# Patient Record
Sex: FEMALE | Race: WHITE | HISPANIC OR LATINO | Employment: FULL TIME | URBAN - NONMETROPOLITAN AREA
[De-identification: names, ages, dates, MRNs, and addresses within clinical notes are randomized per-mention and may not be internally consistent; named-entity substitution may affect disease eponyms.]

---

## 2023-09-17 ENCOUNTER — HOSPITAL ENCOUNTER (EMERGENCY)
Facility: HOSPITAL | Age: 28
Discharge: HOME | End: 2023-09-17
Attending: EMERGENCY MEDICINE
Payer: MEDICAID

## 2023-09-17 ENCOUNTER — APPOINTMENT (OUTPATIENT)
Dept: GENERAL RADIOLOGY | Facility: HOSPITAL | Age: 28
End: 2023-09-17
Payer: MEDICAID

## 2023-09-17 VITALS
HEIGHT: 62 IN | SYSTOLIC BLOOD PRESSURE: 101 MMHG | BODY MASS INDEX: 35.7 KG/M2 | OXYGEN SATURATION: 94 % | TEMPERATURE: 98.4 F | WEIGHT: 194 LBS | HEART RATE: 86 BPM | DIASTOLIC BLOOD PRESSURE: 64 MMHG | RESPIRATION RATE: 18 BRPM

## 2023-09-17 DIAGNOSIS — R13.10 ODYNOPHAGIA: Primary | ICD-10-CM

## 2023-09-17 LAB — S PYO AG THROAT QL: NEGATIVE

## 2023-09-17 PROCEDURE — 93005 ELECTROCARDIOGRAM TRACING: CPT | Performed by: EMERGENCY MEDICINE

## 2023-09-17 PROCEDURE — 71045 X-RAY EXAM CHEST 1 VIEW: CPT

## 2023-09-17 PROCEDURE — 87081 CULTURE SCREEN ONLY: CPT | Performed by: EMERGENCY MEDICINE

## 2023-09-17 PROCEDURE — 99284 EMERGENCY DEPT VISIT MOD MDM: CPT

## 2023-09-17 PROCEDURE — 87880 STREP A ASSAY W/OPTIC: CPT | Performed by: EMERGENCY MEDICINE

## 2023-09-19 LAB — BACTERIA SPEC AEROBE CULT: NORMAL

## 2023-09-22 NOTE — ED PROVIDER NOTES
"Subjective  History of Present Illness:    Chief Complaint: Painful swallowing, feels as though something is stuck in her throat    History of Present Illness: 28-year-old female presents with painful swallowing sore throat feeling the symptoms stuck in her throat for the past 10 days.    Nurses Notes reviewed and agree, including vitals, allergies, social history and prior medical history.     REVIEW OF SYSTEMS: All systems reviewed and not pertinent unless noted.  Review of Systems      Positive for: Painful swallowing feels though something is stuck in her throat    Negative for: Difficulty handling secretions no hoarseness    History reviewed. No pertinent past medical history.    Allergies:    Patient has no known allergies.      History reviewed. No pertinent surgical history.      Social History     Socioeconomic History    Marital status: Single         History reviewed. No pertinent family history.    Objective  Physical Exam:  /64   Pulse 86   Temp 98.4 °F (36.9 °C)   Resp 18   Ht 157.5 cm (62\")   Wt 88 kg (194 lb)   LMP 09/10/2023   SpO2 94%   BMI 35.48 kg/m²      Physical Exam    CONSTITUTIONAL: Well developed, healthy-appearing nontoxic 28-year-old female,  in no acute distress.  VITAL SIGNS: per nursing, reviewed and noted  SKIN: exposed skin with no rashes, ulcerations or petechiae  EYES: Grossly EOMI, no icterus  ENT: Normal voice.  Moist mucous membranes no posterior pharyngeal erythema or exudate.  No trismus  RESPIRATORY:  No increased work of breathing. No retractions.   CARDIOVASCULAR:   Extremities pink and warm.  Good cap refill to extremities.   MUSCULOSKELETAL: Age-appropriate bulk and tone, moves all 4 extremities  NEUROLOGIC: Alert, oriented x 3. No gross deficits. GCS 15.   PSYCH: appropriate affect.  Procedures    ED Course:    Lab Results (last 24 hours)       ** No results found for the last 24 hours. **             No radiology results from the last 24 hrs     MDM   "   Amount and/or Complexity of Data Reviewed  Clinical lab tests: reviewed  Tests in the radiology section of CPT®: reviewed  Tests in the medicine section of CPT®: reviewed  Independent visualization of images, tracings, or specimens: yes             Medical Decision Making:    Initial impression of presenting illness: 28-year-old female presents with painful swallowing sore throat feeling the symptoms stuck in her throat for the past 10 days.      DDX: includes but is not limited to: Tonsillitis         Patient arrives normotensive afebrile no tachycardia sats 94% with vitals interpreted by myself.     Pertinent features from physical exam: Normal voice.  Moist mucous membranes no posterior pharyngeal erythema or exudate.  No trismus  RESPIRATORY:  No increased work of breathing. No retractions. .    Initial diagnostic plan: strep chest x-ray    Results from initial plan were reviewed and interpreted by me revealing no acute findings on my interpretation of chest x-ray.  Strep negative  Throat culture pending  Diagnostic information from other sources: None    Interventions / Re-evaluation: Supportive care, provided information for ENT if persistent symptoms    Results/clinical rationale were discussed with patient    Consultations/Discussion of results with other physicians: None    Disposition plan: Patient was discharged in home stable condition.  Counseled on supportive care, outpatient follow-up. Return precaution discussed.  Patient/family was understanding and agreeable with plan    -----      Final diagnoses:   Odynophagia            Darrin Alston,   09/22/23 0800

## 2025-06-26 ENCOUNTER — ANESTHESIA EVENT (OUTPATIENT)
Dept: PERIOP | Facility: HOSPITAL | Age: 30
End: 2025-06-26
Payer: MEDICAID

## 2025-06-26 ENCOUNTER — ANESTHESIA (OUTPATIENT)
Dept: PERIOP | Facility: HOSPITAL | Age: 30
End: 2025-06-26
Payer: MEDICAID

## 2025-06-26 ENCOUNTER — APPOINTMENT (OUTPATIENT)
Dept: ULTRASOUND IMAGING | Facility: HOSPITAL | Age: 30
End: 2025-06-26
Payer: MEDICAID

## 2025-06-26 ENCOUNTER — HOSPITAL ENCOUNTER (EMERGENCY)
Facility: HOSPITAL | Age: 30
Discharge: HOME | End: 2025-06-26
Attending: STUDENT IN AN ORGANIZED HEALTH CARE EDUCATION/TRAINING PROGRAM
Payer: MEDICAID

## 2025-06-26 VITALS
OXYGEN SATURATION: 95 % | RESPIRATION RATE: 16 BRPM | HEART RATE: 102 BPM | DIASTOLIC BLOOD PRESSURE: 80 MMHG | HEIGHT: 60 IN | SYSTOLIC BLOOD PRESSURE: 106 MMHG | WEIGHT: 195 LBS | BODY MASS INDEX: 38.28 KG/M2 | TEMPERATURE: 97 F

## 2025-06-26 DIAGNOSIS — O03.9 FETAL DEMISE DUE TO MISCARRIAGE: Primary | ICD-10-CM

## 2025-06-26 DIAGNOSIS — O02.1 MISSED ABORTION: ICD-10-CM

## 2025-06-26 LAB
ABO GROUP BLD: NORMAL
ABO GROUP BLD: NORMAL
ALBUMIN SERPL-MCNC: 3.5 G/DL (ref 3.5–5.2)
ALBUMIN/GLOB SERPL: 1.1 G/DL
ALP SERPL-CCNC: 51 U/L (ref 39–117)
ALT SERPL W P-5'-P-CCNC: 35 U/L (ref 1–33)
ANION GAP SERPL CALCULATED.3IONS-SCNC: 11.8 MMOL/L (ref 5–15)
AST SERPL-CCNC: 19 U/L (ref 1–32)
BACTERIA UR QL AUTO: ABNORMAL /HPF
BASOPHILS # BLD AUTO: 0.02 10*3/MM3 (ref 0–0.2)
BASOPHILS NFR BLD AUTO: 0.2 % (ref 0–1.5)
BILIRUB SERPL-MCNC: 0.2 MG/DL (ref 0–1.2)
BILIRUB UR QL STRIP: NEGATIVE
BUN SERPL-MCNC: 5 MG/DL (ref 6–20)
BUN/CREAT SERPL: 11.4 (ref 7–25)
CALCIUM SPEC-SCNC: 9 MG/DL (ref 8.6–10.5)
CHLORIDE SERPL-SCNC: 105 MMOL/L (ref 98–107)
CLARITY UR: ABNORMAL
CO2 SERPL-SCNC: 22.2 MMOL/L (ref 22–29)
COLOR UR: ABNORMAL
CREAT SERPL-MCNC: 0.44 MG/DL (ref 0.57–1)
CRP SERPL-MCNC: 2.22 MG/DL (ref 0–0.5)
D-LACTATE SERPL-SCNC: 1.8 MMOL/L (ref 0.5–2)
DEPRECATED RDW RBC AUTO: 40.3 FL (ref 37–54)
EGFRCR SERPLBLD CKD-EPI 2021: 133.6 ML/MIN/1.73
EOSINOPHIL # BLD AUTO: 0.12 10*3/MM3 (ref 0–0.4)
EOSINOPHIL NFR BLD AUTO: 1.2 % (ref 0.3–6.2)
ERYTHROCYTE [DISTWIDTH] IN BLOOD BY AUTOMATED COUNT: 12.6 % (ref 12.3–15.4)
FLUAV RNA RESP QL NAA+PROBE: NOT DETECTED
FLUBV RNA RESP QL NAA+PROBE: NOT DETECTED
GLOBULIN UR ELPH-MCNC: 3.3 GM/DL
GLUCOSE SERPL-MCNC: 84 MG/DL (ref 65–99)
GLUCOSE UR STRIP-MCNC: NEGATIVE MG/DL
HCG INTACT+B SERPL-ACNC: NORMAL MIU/ML
HCT VFR BLD AUTO: 39.4 % (ref 34–46.6)
HGB BLD-MCNC: 13.3 G/DL (ref 12–15.9)
HGB UR QL STRIP.AUTO: ABNORMAL
HYALINE CASTS UR QL AUTO: ABNORMAL /LPF
IMM GRANULOCYTES # BLD AUTO: 0.02 10*3/MM3 (ref 0–0.05)
IMM GRANULOCYTES NFR BLD AUTO: 0.2 % (ref 0–0.5)
KETONES UR QL STRIP: NEGATIVE
LEUKOCYTE ESTERASE UR QL STRIP.AUTO: ABNORMAL
LIPASE SERPL-CCNC: 24 U/L (ref 13–60)
LYMPHOCYTES # BLD AUTO: 2.58 10*3/MM3 (ref 0.7–3.1)
LYMPHOCYTES NFR BLD AUTO: 25.6 % (ref 19.6–45.3)
MCH RBC QN AUTO: 29.4 PG (ref 26.6–33)
MCHC RBC AUTO-ENTMCNC: 33.8 G/DL (ref 31.5–35.7)
MCV RBC AUTO: 87.2 FL (ref 79–97)
MONOCYTES # BLD AUTO: 0.61 10*3/MM3 (ref 0.1–0.9)
MONOCYTES NFR BLD AUTO: 6 % (ref 5–12)
NEUTROPHILS NFR BLD AUTO: 6.74 10*3/MM3 (ref 1.7–7)
NEUTROPHILS NFR BLD AUTO: 66.8 % (ref 42.7–76)
NITRITE UR QL STRIP: NEGATIVE
NRBC BLD AUTO-RTO: 0 /100 WBC (ref 0–0.2)
NUMBER OF DOSES: NORMAL
PH UR STRIP.AUTO: 7 [PH] (ref 5–8)
PLATELET # BLD AUTO: 304 10*3/MM3 (ref 140–450)
PMV BLD AUTO: 10.6 FL (ref 6–12)
POTASSIUM SERPL-SCNC: 3.9 MMOL/L (ref 3.5–5.2)
PROT SERPL-MCNC: 6.8 G/DL (ref 6–8.5)
PROT UR QL STRIP: ABNORMAL
RBC # BLD AUTO: 4.52 10*6/MM3 (ref 3.77–5.28)
RBC # UR STRIP: ABNORMAL /HPF
REF LAB TEST METHOD: ABNORMAL
RH BLD: POSITIVE
RH BLD: POSITIVE
SARS-COV-2 RNA RESP QL NAA+PROBE: NOT DETECTED
SODIUM SERPL-SCNC: 139 MMOL/L (ref 136–145)
SP GR UR STRIP: 1.02 (ref 1–1.03)
SQUAMOUS #/AREA URNS HPF: ABNORMAL /HPF
UROBILINOGEN UR QL STRIP: ABNORMAL
WBC # UR STRIP: ABNORMAL /HPF
WBC NRBC COR # BLD AUTO: 10.09 10*3/MM3 (ref 3.4–10.8)

## 2025-06-26 PROCEDURE — 86900 BLOOD TYPING SEROLOGIC ABO: CPT

## 2025-06-26 PROCEDURE — 25010000002 PROPOFOL 10 MG/ML EMULSION: Performed by: NURSE ANESTHETIST, CERTIFIED REGISTERED

## 2025-06-26 PROCEDURE — 99223 1ST HOSP IP/OBS HIGH 75: CPT | Performed by: OBSTETRICS & GYNECOLOGY

## 2025-06-26 PROCEDURE — 76817 TRANSVAGINAL US OBSTETRIC: CPT

## 2025-06-26 PROCEDURE — 86901 BLOOD TYPING SEROLOGIC RH(D): CPT | Performed by: OBSTETRICS & GYNECOLOGY

## 2025-06-26 PROCEDURE — 25010000002 MIDAZOLAM PER 1MG: Performed by: NURSE ANESTHETIST, CERTIFIED REGISTERED

## 2025-06-26 PROCEDURE — 25010000002 FAMOTIDINE 10 MG/ML SOLUTION: Performed by: NURSE ANESTHETIST, CERTIFIED REGISTERED

## 2025-06-26 PROCEDURE — 81001 URINALYSIS AUTO W/SCOPE: CPT | Performed by: STUDENT IN AN ORGANIZED HEALTH CARE EDUCATION/TRAINING PROGRAM

## 2025-06-26 PROCEDURE — 25010000002 FENTANYL CITRATE (PF) 50 MCG/ML SOLUTION PREFILLED SYRINGE: Performed by: NURSE ANESTHETIST, CERTIFIED REGISTERED

## 2025-06-26 PROCEDURE — 25010000002 LIDOCAINE 1% - EPINEPHRINE 1:100000 1 %-1:100000 SOLUTION: Performed by: OBSTETRICS & GYNECOLOGY

## 2025-06-26 PROCEDURE — 86901 BLOOD TYPING SEROLOGIC RH(D): CPT

## 2025-06-26 PROCEDURE — 87636 SARSCOV2 & INF A&B AMP PRB: CPT | Performed by: STUDENT IN AN ORGANIZED HEALTH CARE EDUCATION/TRAINING PROGRAM

## 2025-06-26 PROCEDURE — 83605 ASSAY OF LACTIC ACID: CPT | Performed by: STUDENT IN AN ORGANIZED HEALTH CARE EDUCATION/TRAINING PROGRAM

## 2025-06-26 PROCEDURE — 80053 COMPREHEN METABOLIC PANEL: CPT | Performed by: STUDENT IN AN ORGANIZED HEALTH CARE EDUCATION/TRAINING PROGRAM

## 2025-06-26 PROCEDURE — 87086 URINE CULTURE/COLONY COUNT: CPT | Performed by: STUDENT IN AN ORGANIZED HEALTH CARE EDUCATION/TRAINING PROGRAM

## 2025-06-26 PROCEDURE — 25010000002 HYDROMORPHONE PER 4 MG: Performed by: NURSE ANESTHETIST, CERTIFIED REGISTERED

## 2025-06-26 PROCEDURE — 25010000002 LIDOCAINE (CARDIAC): Performed by: NURSE ANESTHETIST, CERTIFIED REGISTERED

## 2025-06-26 PROCEDURE — 86900 BLOOD TYPING SEROLOGIC ABO: CPT | Performed by: OBSTETRICS & GYNECOLOGY

## 2025-06-26 PROCEDURE — 25010000002 KETOROLAC TROMETHAMINE PER 15 MG: Performed by: NURSE ANESTHETIST, CERTIFIED REGISTERED

## 2025-06-26 PROCEDURE — 84702 CHORIONIC GONADOTROPIN TEST: CPT | Performed by: STUDENT IN AN ORGANIZED HEALTH CARE EDUCATION/TRAINING PROGRAM

## 2025-06-26 PROCEDURE — 99285 EMERGENCY DEPT VISIT HI MDM: CPT

## 2025-06-26 PROCEDURE — 25810000003 LACTATED RINGERS PER 1000 ML: Performed by: NURSE ANESTHETIST, CERTIFIED REGISTERED

## 2025-06-26 PROCEDURE — 59820 CARE OF MISCARRIAGE: CPT | Performed by: OBSTETRICS & GYNECOLOGY

## 2025-06-26 PROCEDURE — 83690 ASSAY OF LIPASE: CPT | Performed by: STUDENT IN AN ORGANIZED HEALTH CARE EDUCATION/TRAINING PROGRAM

## 2025-06-26 PROCEDURE — 85025 COMPLETE CBC W/AUTO DIFF WBC: CPT | Performed by: STUDENT IN AN ORGANIZED HEALTH CARE EDUCATION/TRAINING PROGRAM

## 2025-06-26 PROCEDURE — 25010000002 DEXAMETHASONE PER 1 MG: Performed by: NURSE ANESTHETIST, CERTIFIED REGISTERED

## 2025-06-26 PROCEDURE — 36415 COLL VENOUS BLD VENIPUNCTURE: CPT

## 2025-06-26 PROCEDURE — 86140 C-REACTIVE PROTEIN: CPT | Performed by: STUDENT IN AN ORGANIZED HEALTH CARE EDUCATION/TRAINING PROGRAM

## 2025-06-26 PROCEDURE — 25010000002 ONDANSETRON PER 1 MG: Performed by: NURSE ANESTHETIST, CERTIFIED REGISTERED

## 2025-06-26 PROCEDURE — 99285 EMERGENCY DEPT VISIT HI MDM: CPT | Performed by: STUDENT IN AN ORGANIZED HEALTH CARE EDUCATION/TRAINING PROGRAM

## 2025-06-26 RX ORDER — ACETAMINOPHEN 500 MG
1000 TABLET ORAL EVERY 8 HOURS PRN
Qty: 60 TABLET | Refills: 0 | Status: SHIPPED | OUTPATIENT
Start: 2025-06-26 | End: 2026-06-26

## 2025-06-26 RX ORDER — SODIUM CHLORIDE 0.9 % (FLUSH) 0.9 %
3 SYRINGE (ML) INJECTION EVERY 12 HOURS SCHEDULED
Status: DISCONTINUED | OUTPATIENT
Start: 2025-06-26 | End: 2025-06-26 | Stop reason: HOSPADM

## 2025-06-26 RX ORDER — FENTANYL CITRATE 50 UG/ML
INJECTION, SOLUTION INTRAMUSCULAR; INTRAVENOUS AS NEEDED
Status: DISCONTINUED | OUTPATIENT
Start: 2025-06-26 | End: 2025-06-26 | Stop reason: SURG

## 2025-06-26 RX ORDER — DEXAMETHASONE SODIUM PHOSPHATE 4 MG/ML
INJECTION, SOLUTION INTRA-ARTICULAR; INTRALESIONAL; INTRAMUSCULAR; INTRAVENOUS; SOFT TISSUE AS NEEDED
Status: DISCONTINUED | OUTPATIENT
Start: 2025-06-26 | End: 2025-06-26 | Stop reason: SURG

## 2025-06-26 RX ORDER — IBUPROFEN 600 MG/1
600 TABLET, FILM COATED ORAL EVERY 6 HOURS PRN
Status: DISCONTINUED | OUTPATIENT
Start: 2025-06-26 | End: 2025-06-26 | Stop reason: HOSPADM

## 2025-06-26 RX ORDER — KETOROLAC TROMETHAMINE 30 MG/ML
INJECTION, SOLUTION INTRAMUSCULAR; INTRAVENOUS AS NEEDED
Status: DISCONTINUED | OUTPATIENT
Start: 2025-06-26 | End: 2025-06-26 | Stop reason: SURG

## 2025-06-26 RX ORDER — DOXYCYCLINE 100 MG/1
200 CAPSULE ORAL ONCE
Status: COMPLETED | OUTPATIENT
Start: 2025-06-26 | End: 2025-06-26

## 2025-06-26 RX ORDER — ONDANSETRON 2 MG/ML
INJECTION INTRAMUSCULAR; INTRAVENOUS AS NEEDED
Status: DISCONTINUED | OUTPATIENT
Start: 2025-06-26 | End: 2025-06-26 | Stop reason: SURG

## 2025-06-26 RX ORDER — PROPOFOL 10 MG/ML
VIAL (ML) INTRAVENOUS AS NEEDED
Status: DISCONTINUED | OUTPATIENT
Start: 2025-06-26 | End: 2025-06-26 | Stop reason: SURG

## 2025-06-26 RX ORDER — MIDAZOLAM HYDROCHLORIDE 2 MG/2ML
INJECTION, SOLUTION INTRAMUSCULAR; INTRAVENOUS AS NEEDED
Status: DISCONTINUED | OUTPATIENT
Start: 2025-06-26 | End: 2025-06-26 | Stop reason: SURG

## 2025-06-26 RX ORDER — HYDROMORPHONE HYDROCHLORIDE 2 MG/ML
INJECTION, SOLUTION INTRAMUSCULAR; INTRAVENOUS; SUBCUTANEOUS AS NEEDED
Status: DISCONTINUED | OUTPATIENT
Start: 2025-06-26 | End: 2025-06-26 | Stop reason: SURG

## 2025-06-26 RX ORDER — LIDOCAINE HYDROCHLORIDE AND EPINEPHRINE 10; 10 MG/ML; UG/ML
INJECTION, SOLUTION INFILTRATION; PERINEURAL AS NEEDED
Status: DISCONTINUED | OUTPATIENT
Start: 2025-06-26 | End: 2025-06-26 | Stop reason: HOSPADM

## 2025-06-26 RX ORDER — FAMOTIDINE 10 MG/ML
20 INJECTION, SOLUTION INTRAVENOUS ONCE
Status: COMPLETED | OUTPATIENT
Start: 2025-06-26 | End: 2025-06-26

## 2025-06-26 RX ORDER — SODIUM CHLORIDE 9 MG/ML
40 INJECTION, SOLUTION INTRAVENOUS AS NEEDED
Status: DISCONTINUED | OUTPATIENT
Start: 2025-06-26 | End: 2025-06-26 | Stop reason: HOSPADM

## 2025-06-26 RX ORDER — HYDROCODONE BITARTRATE AND ACETAMINOPHEN 5; 325 MG/1; MG/1
1 TABLET ORAL ONCE AS NEEDED
Status: DISCONTINUED | OUTPATIENT
Start: 2025-06-26 | End: 2025-06-26 | Stop reason: HOSPADM

## 2025-06-26 RX ORDER — SODIUM CHLORIDE, SODIUM LACTATE, POTASSIUM CHLORIDE, CALCIUM CHLORIDE 600; 310; 30; 20 MG/100ML; MG/100ML; MG/100ML; MG/100ML
INJECTION, SOLUTION INTRAVENOUS CONTINUOUS PRN
Status: DISCONTINUED | OUTPATIENT
Start: 2025-06-26 | End: 2025-06-26 | Stop reason: SURG

## 2025-06-26 RX ORDER — SODIUM CHLORIDE 0.9 % (FLUSH) 0.9 %
10 SYRINGE (ML) INJECTION AS NEEDED
Status: DISCONTINUED | OUTPATIENT
Start: 2025-06-26 | End: 2025-06-26 | Stop reason: HOSPADM

## 2025-06-26 RX ORDER — DOXYCYCLINE 100 MG/1
100 CAPSULE ORAL ONCE
Status: COMPLETED | OUTPATIENT
Start: 2025-06-26 | End: 2025-06-26

## 2025-06-26 RX ORDER — IBUPROFEN 800 MG/1
800 TABLET, FILM COATED ORAL EVERY 8 HOURS PRN
Qty: 30 TABLET | Refills: 0 | Status: SHIPPED | OUTPATIENT
Start: 2025-06-26

## 2025-06-26 RX ADMIN — LIDOCAINE HYDROCHLORIDE 50 MG: 20 INJECTION, SOLUTION INTRAVENOUS at 10:17

## 2025-06-26 RX ADMIN — DEXAMETHASONE SODIUM PHOSPHATE 4 MG: 4 INJECTION, SOLUTION INTRA-ARTICULAR; INTRALESIONAL; INTRAMUSCULAR; INTRAVENOUS; SOFT TISSUE at 10:24

## 2025-06-26 RX ADMIN — KETOROLAC TROMETHAMINE 15 MG: 30 INJECTION, SOLUTION INTRAMUSCULAR; INTRAVENOUS at 10:24

## 2025-06-26 RX ADMIN — PROPOFOL 100 MCG/KG/MIN: 10 INJECTION, EMULSION INTRAVENOUS at 10:17

## 2025-06-26 RX ADMIN — DOXYCYCLINE 200 MG: 100 CAPSULE ORAL at 11:06

## 2025-06-26 RX ADMIN — SODIUM CHLORIDE, POTASSIUM CHLORIDE, SODIUM LACTATE AND CALCIUM CHLORIDE: 600; 310; 30; 20 INJECTION, SOLUTION INTRAVENOUS at 10:08

## 2025-06-26 RX ADMIN — DOXYCYCLINE 100 MG: 100 CAPSULE ORAL at 10:00

## 2025-06-26 RX ADMIN — FAMOTIDINE 20 MG: 10 INJECTION, SOLUTION INTRAVENOUS at 10:00

## 2025-06-26 RX ADMIN — FENTANYL CITRATE 50 MCG: 50 INJECTION, SOLUTION INTRAMUSCULAR; INTRAVENOUS at 10:11

## 2025-06-26 RX ADMIN — HYDROMORPHONE HYDROCHLORIDE 0.5 MG: 2 INJECTION, SOLUTION INTRAMUSCULAR; INTRAVENOUS; SUBCUTANEOUS at 10:27

## 2025-06-26 RX ADMIN — PROPOFOL 100 MG: 10 INJECTION, EMULSION INTRAVENOUS at 10:16

## 2025-06-26 RX ADMIN — MIDAZOLAM HYDROCHLORIDE 2 MG: 1 INJECTION, SOLUTION INTRAMUSCULAR; INTRAVENOUS at 10:11

## 2025-06-26 RX ADMIN — ONDANSETRON 4 MG: 2 INJECTION INTRAMUSCULAR; INTRAVENOUS at 10:24

## 2025-06-26 NOTE — ANESTHESIA PREPROCEDURE EVALUATION
Anesthesia Evaluation     Patient summary reviewed and Nursing notes reviewed   no history of anesthetic complications:   NPO Solid Status: > 8 hours  NPO Liquid Status: > 8 hours           Airway   Mallampati: II  TM distance: >3 FB  Neck ROM: full  No difficulty expected  Dental - normal exam     Pulmonary - negative pulmonary ROS and normal exam   Cardiovascular - negative cardio ROS and normal exam  Exercise tolerance: good (4-7 METS)    ECG reviewed      ROS comment: EKG: SR     Neuro/Psych- negative ROS  GI/Hepatic/Renal/Endo - negative ROS     Musculoskeletal (-) negative ROS    Abdominal    Substance History - negative use     OB/GYN          Other - negative ROS       ROS/Med Hx Other: 13.3/39.4  K 3.9              Anesthesia Plan    ASA 3     MAC     (Risks and benefits discussed including risk of aspiration, recall and dental damage. All patient questions answered. Will continue with POC.)  intravenous induction     Anesthetic plan, risks, benefits, and alternatives have been provided, discussed and informed consent has been obtained with: patient.    Plan discussed with CRNA.    CODE STATUS:    Code Status (Patient has no pulse and is not breathing): CPR (Attempt to Resuscitate)  Medical Interventions (Patient has pulse or is breathing): Full Support  Level Of Support Discussed With: Patient

## 2025-06-26 NOTE — ED PROVIDER NOTES
Subjective:  History of Present Illness:    Patient is a 30-year-old female, G1, P0 with no significant medical history who presents today with vaginal bleeding.  Reports that she began to have vaginal bleeding this morning, patient is 10 weeks and was concerned for fetal viability and now presents.  Patient reports that she has had a ultrasound prior to arrival that showed intrauterine pregnancy with twins.  She denies any fevers.  No chest pain.  Denies any shortness of breath.  No abdominal pain or cramping.  Reports nausea with no vomiting or diarrhea.  Denies any dysuria.  No new leg swelling or leg pain.      Nurses Notes reviewed and agree, including vitals, allergies, social history and prior medical history.     REVIEW OF SYSTEMS: All systems reviewed and not pertinent unless noted.  Review of Systems   Constitutional:  Positive for activity change. Negative for appetite change, chills, fatigue and fever.   HENT:  Negative for rhinorrhea, sinus pressure and sinus pain.    Eyes:  Negative for discharge and itching.   Respiratory:  Negative for cough and shortness of breath.    Cardiovascular:  Negative for chest pain and leg swelling.   Gastrointestinal:  Negative for abdominal distention, abdominal pain, nausea and vomiting.   Endocrine: Negative for cold intolerance and heat intolerance.   Genitourinary:  Positive for vaginal bleeding. Negative for decreased urine volume, difficulty urinating, flank pain, frequency, urgency, vaginal discharge and vaginal pain.   Musculoskeletal:  Negative for gait problem, neck pain and neck stiffness.   Skin:  Negative for color change.   Allergic/Immunologic: Negative for environmental allergies.   Neurological:  Negative for seizures, syncope, facial asymmetry and speech difficulty.   Psychiatric/Behavioral:  Negative for self-injury and suicidal ideas.        Past Medical History:   Diagnosis Date    GERD (gastroesophageal reflux disease)        Allergies:    Patient  "has no known allergies.      History reviewed. No pertinent surgical history.      Social History     Socioeconomic History    Marital status: Single   Tobacco Use    Smoking status: Never    Smokeless tobacco: Never   Vaping Use    Vaping status: Never Used   Substance and Sexual Activity    Alcohol use: Never    Drug use: Never    Sexual activity: Defer         History reviewed. No pertinent family history.    Objective  Physical Exam:  /80 (Patient Position: Sitting)   Pulse 102   Temp 97 °F (36.1 °C) (Temporal)   Resp 16   Ht 152.4 cm (60\")   Wt 88.5 kg (195 lb)   LMP 04/18/2025   SpO2 95%   BMI 38.08 kg/m²      Physical Exam  Constitutional:       General: She is not in acute distress.     Appearance: Normal appearance. She is obese. She is not ill-appearing.   HENT:      Head: Normocephalic and atraumatic.      Nose: Nose normal. No congestion or rhinorrhea.      Mouth/Throat:      Mouth: Mucous membranes are dry.      Pharynx: Oropharynx is clear. No oropharyngeal exudate or posterior oropharyngeal erythema.   Eyes:      Extraocular Movements: Extraocular movements intact.      Conjunctiva/sclera: Conjunctivae normal.      Pupils: Pupils are equal, round, and reactive to light.   Cardiovascular:      Rate and Rhythm: Normal rate and regular rhythm.      Pulses: Normal pulses.      Heart sounds: Normal heart sounds.   Pulmonary:      Effort: Pulmonary effort is normal. No respiratory distress.      Breath sounds: Normal breath sounds.   Abdominal:      General: Abdomen is flat. Bowel sounds are normal. There is no distension.      Palpations: Abdomen is soft.      Tenderness: There is no abdominal tenderness. There is no right CVA tenderness, left CVA tenderness, guarding or rebound.   Musculoskeletal:         General: No swelling or tenderness. Normal range of motion.      Cervical back: Normal range of motion and neck supple.   Skin:     General: Skin is warm and dry.      Capillary Refill: " Capillary refill takes less than 2 seconds.   Neurological:      General: No focal deficit present.      Mental Status: She is alert and oriented to person, place, and time. Mental status is at baseline.      Cranial Nerves: No cranial nerve deficit.      Sensory: No sensory deficit.      Motor: No weakness.      Coordination: Coordination normal.      Gait: Gait normal.   Psychiatric:         Mood and Affect: Mood normal.         Behavior: Behavior normal.         Thought Content: Thought content normal.         Judgment: Judgment normal.         Procedures    ED Course:         Lab Results (last 24 hours)       Procedure Component Value Units Date/Time    CBC & Differential [[id number]]  (Normal) Collected: 06/26/25 0711    Specimen: Blood Updated: 06/26/25 0723    Narrative:      The following orders were created for panel order CBC & Differential.  Procedure                               Abnormality         Status                     ---------                               -----------         ------                     CBC Auto Differential[[id number]]        Normal              Final result                 Please view results for these tests on the individual orders.    Comprehensive Metabolic Panel [[id number]]  (Abnormal) Collected: 06/26/25 0711    Specimen: Blood Updated: 06/26/25 0806     Glucose 84 mg/dL      BUN 5.0 mg/dL      Creatinine 0.44 mg/dL      Sodium 139 mmol/L      Potassium 3.9 mmol/L      Comment: Slight hemolysis detected by analyzer. Result may be falsely elevated.        Chloride 105 mmol/L      CO2 22.2 mmol/L      Calcium 9.0 mg/dL      Total Protein 6.8 g/dL      Albumin 3.5 g/dL      ALT (SGPT) 35 U/L      AST (SGOT) 19 U/L      Comment: Slight hemolysis detected by analyzer. Result may be falsely elevated.        Alkaline Phosphatase 51 U/L      Total Bilirubin 0.2 mg/dL      Globulin 3.3 gm/dL      A/G Ratio 1.1 g/dL      BUN/Creatinine Ratio 11.4     Anion Gap 11.8 mmol/L      eGFR  133.6 mL/min/1.73     Narrative:      GFR Categories in Chronic Kidney Disease (CKD)              GFR Category          GFR (mL/min/1.73)    Interpretation  G1                    90 or greater        Normal or high (1)  G2                    60-89                Mild decrease (1)  G3a                   45-59                Mild to moderate decrease  G3b                   30-44                Moderate to severe decrease  G4                    15-29                Severe decrease  G5                    14 or less           Kidney failure    (1)In the absence of evidence of kidney disease, neither GFR category G1 or G2 fulfill the criteria for CKD.    eGFR calculation 2021 CKD-EPI creatinine equation, which does not include race as a factor    Lipase [[id number]]  (Normal) Collected: 06/26/25 0711    Specimen: Blood Updated: 06/26/25 0740     Lipase 24 U/L     hCG, Quantitative, Pregnancy [[id number]] Collected: 06/26/25 0711    Specimen: Blood Updated: 06/26/25 0835     HCG Quantitative 125,303.00 mIU/mL     Narrative:      HCG Ranges by Gestational Age    Females - non-pregnant premenopausal   </= 1mIU/mL HCG  Females - postmenopausal               </= 7mIU/mL HCG    3 Weeks         5.8 -    71.2 mIU/mL  4 Weeks         9.5 -     750 mIU/mL  5 Weeks         217 -   7,138 mIU/mL  6 Weeks         158 -  31,795 mIU/mL  7 Weeks       3,697 - 163,563 mIU/mL  8 Weeks      32,065 - 149,571 mIU/mL  9 Weeks      63,803 - 151,410 mIU/mL  10 Weeks     46,509 - 186,977 mIU/mL  12 Weeks     27,832 - 210,612 mIU/mL  14 Weeks     13,950 -  62,530 mIU/mL  15 Weeks     12,039 -  70,971 mIU/mL  16 Weeks      9,040 -  56,451 mIU/mL  17 Weeks      8,175 -  55,868 mIU/mL  18 Weeks      8,099 -  58,176 mIU/mL    C-reactive Protein [[id number]]  (Abnormal) Collected: 06/26/25 0711    Specimen: Blood Updated: 06/26/25 0740     C-Reactive Protein 2.22 mg/dL     Lactic Acid, Plasma [[id number]]  (Normal) Collected: 06/26/25 0711    Specimen:  Blood Updated: 06/26/25 0738     Lactate 1.8 mmol/L     CBC Auto Differential [[id number]]  (Normal) Collected: 06/26/25 0711    Specimen: Blood Updated: 06/26/25 0723     WBC 10.09 10*3/mm3      RBC 4.52 10*6/mm3      Hemoglobin 13.3 g/dL      Hematocrit 39.4 %      MCV 87.2 fL      MCH 29.4 pg      MCHC 33.8 g/dL      RDW 12.6 %      RDW-SD 40.3 fl      MPV 10.6 fL      Platelets 304 10*3/mm3      Neutrophil % 66.8 %      Lymphocyte % 25.6 %      Monocyte % 6.0 %      Eosinophil % 1.2 %      Basophil % 0.2 %      Immature Grans % 0.2 %      Neutrophils, Absolute 6.74 10*3/mm3      Lymphocytes, Absolute 2.58 10*3/mm3      Monocytes, Absolute 0.61 10*3/mm3      Eosinophils, Absolute 0.12 10*3/mm3      Basophils, Absolute 0.02 10*3/mm3      Immature Grans, Absolute 0.02 10*3/mm3      nRBC 0.0 /100 WBC     Urinalysis With Culture If Indicated - Urine, Clean Catch [[id number]]  (Abnormal) Collected: 06/26/25 0712    Specimen: Urine, Clean Catch Updated: 06/26/25 0738     Color, UA Red     Appearance, UA Turbid     pH, UA 7.0     Specific Gravity, UA 1.017     Glucose, UA Negative     Ketones, UA Negative     Bilirubin, UA Negative     Blood, UA Large (3+)     Protein,  mg/dL (2+)     Leuk Esterase, UA Small (1+)     Nitrite, UA Negative     Urobilinogen, UA 0.2 E.U./dL    Narrative:      In absence of clinical symptoms, the presence of pyuria, bacteria, and/or nitrites on the urinalysis result does not correlate with infection.    Urinalysis, Microscopic Only - Urine, Clean Catch [[id number]]  (Abnormal) Collected: 06/26/25 0712    Specimen: Urine, Clean Catch Updated: 06/26/25 0738     RBC, UA Too Numerous to Count /HPF      WBC, UA       Unable to determine due to loaded field     /HPF     Bacteria, UA       Unable to determine due to loaded field     /HPF     Squamous Epithelial Cells, UA       Unable to determine due to loaded field     /HPF     Hyaline Casts, UA       Unable to determine due to loaded field      /LPF     Methodology Manual Light Microscopy    Urine Culture - Urine, Urine, Clean Catch [[id number]] Collected: 06/26/25 0712    Specimen: Urine, Clean Catch Updated: 06/26/25 0738    COVID-19 and FLU A/B PCR, 1 HR TAT - Swab, Nasopharynx [[id number]]  (Normal) Collected: 06/26/25 0717    Specimen: Swab from Nasopharynx Updated: 06/26/25 0745     COVID19 Not Detected     Influenza A PCR Not Detected     Influenza B PCR Not Detected    Narrative:      Fact sheet for providers: https://www.fda.gov/media/596715/download    Fact sheet for patients: https://www.fda.gov/media/036343/download    Test performed by PCR.             US Ob Transvaginal  Result Date: 6/26/2025  PELVIC ULTRASOUND  HISTORY: 10 weeks pregnant, vaginal bleeding.  PROCEDURE:  Transvaginal images of the pelvis were obtained.  FINDINGS: There is a single intrauterine gestational sac. Yolk sac is identified. 2 fetal poles identified. Measurements of one fetal pole correlates with a gestational age of 9 weeks 6 days and 8 weeks 6 days for the other fetal pole. No fetal heart motion identified in either fetal pole. There is a small crescent-shaped hypoechoic area adjacent to the gestational sac most consistent with a small subchorionic hemorrhage. There is no free fluid seen. Neither ovary identified.         Impression: Single intrauterine gestational sac and single yolk sac; 2 fetal poles identified but no fetal heart motion identified in either fetal pole very concerning for fetal demise/nonviable pregnancy.  Neither ovary visualized on this exam.    This report was signed and finalized on 6/26/2025 8:27 AM by Alina Huffman MD.           MDM      Initial impression of presenting illness: Vaginal bleeding    DDX: includes but is not limited to: Threatened miscarriage, ectopic pregnancy, completed miscarriage    Patient arrives stable with vitals interpreted by myself.     Pertinent features from physical exam: Clear to auscultation, regular rate and  rhythm, no murmur, unreactive to Elva palpation of the time of my exam.    Initial diagnostic plan: CBC, CMP, lipase, UA, CRP, lactic acid, transvaginal ultrasound    Results from initial plan were reviewed and interpreted by me revealing concern for fetal demise given no heartbeat and appearance of fetus is not consistent with dates    Diagnostic information from other sources: Discussed with patient's  at the bedside reviewed past medical records    Interventions / Re-evaluation: Observed in emergency department for several hours with no change in vital signs    Results/clinical rationale were discussed with patient and  at bedside    Consultations/Discussion of results with other physicians: Given my concern for intrauterine fetal demise I discussed the patient's case with Dr. Hannah, obstetrics.  He came to bedside and discussed treatment options with patient.  Patient ultimately opts for D&C and the patient was taken to the OR with Dr. Hannah for operative management    Disposition plan: To the OR  -----        Final diagnoses:   Fetal demise due to miscarriage          Manjinder Salas MD  06/26/25 4513

## 2025-06-26 NOTE — ANESTHESIA POSTPROCEDURE EVALUATION
Patient: Pushpa Santa    Procedure Summary       Date: 25 Room / Location: [facility] OR  / [facility] OR    Anesthesia Start: 1008 Anesthesia Stop: 1046    Procedure: DILATION AND CURETTAGE WITH SUCTION (Vagina) Diagnosis:       Missed       (Missed  [O02.1])    Surgeons: Js Hannah MD Provider: Sherry Carey CRNA    Anesthesia Type: MAC ASA Status: 3            Anesthesia Type: MAC    Vitals  Vitals Value Taken Time   BP 95/50 25 10:46   Temp 97 °F (36.1 °C) 25 10:46   Pulse 107 25 10:46   Resp 16 25 10:46   SpO2 94 % 25 10:46           Post Anesthesia Care and Evaluation    Patient location during evaluation: PHASE II  Patient participation: complete - patient participated  Level of consciousness: awake and alert  Pain score: 0  Pain management: satisfactory to patient    Airway patency: patent  Anesthetic complications: No anesthetic complications  PONV Status: none  Cardiovascular status: acceptable and stable  Respiratory status: acceptable  Hydration status: acceptable    Comments: Vitals signs as noted in nursing documentation as per protocol.

## 2025-06-26 NOTE — DISCHARGE INSTRUCTIONS
Pelvic Surgery  Home Care Instructions:  Dr. Js Hannah      Thank you for choosing [facility]. Please let us know if you have questions or concerns or do not understand the information we give you. Always ask us to explain words or phrases you do not understand.    You have had pelvic surgery. Here are some basic guidelines to help you recover more quickly at home. Your doctor may give you more guidelines. If you have any questions after you go home, please call the numbers listed on the next page.    General Guidelines    1. You may resume normal daily activities.  2. Do not put anything in the vagina (no tampons or douching).    3.   Do not have sex until cleared by your physician.  4.   You may climb steps.  5. You may bathe or shower.  6. The only exercise you should do is walking. This is important for your recovery.  7. Do not drive while taking narcotics.  8. Take the medicines you were taking before surgery. Other medicines you need to take at home are:    Alternate Motrin and Tylenol around the clock. Take each every 8 hours in alternation so that you are getting one of the medications every 4 hours.      Metamucil + Colace daily to keep bowel movements soft        If you have questions about your medicines, let us know. Or, talk to your local pharmacist.    Surgery, lack of activity, pain medicines and iron pills tend to cause constipation. Take something every day to prevent constipation and straining.  Taking something like Metamucil® every day to increase fiber may prevent constipation. Follow the instructions on the container. If you need a gentle laxative, then something like Milk of Magnesia® or Correctol® work fairly well. You should not need to take laxatives often.    10. Call your doctor if you have:     a. Fever of 101 degrees or higher.  b. Heavy vaginal bleeding.  c. Worsening nausea or pain.  d. Other problems or concern.    If you have any questions or concerns about  your usual routines, please talk to the nurse or doctor.       To talk with your doctor at [facility], call:  [facility]  Phone: [phone]      We appreciate the opportunity you have given us to participate in your care.     No pushing, pulling, tugging,  heavy lifting, or strenuous activity.  No major decision making, driving, or drinking alcoholic beverages for 24 hours. ( due to the medications you have  received)  Always use good hand hygiene/washing techniques.  NO driving while taking pain medications.    * if you have an incision:  Check your incision area every day for signs of infection.   Check for:  * more redness, swelling, or pain  *more fluid or blood  *warmth  *pus or bad smellTo assist you in voiding:  Drink plenty of fluids  Listen to running water while attempting to void.    If you are unable to urinate and you have an uncomfortable urge to void or it has been   6 hours since you were discharged, return to the Emergency Room

## 2025-06-26 NOTE — OP NOTE
Earl Santa  : 1995  MRN: [id number]  CSN: [id number]  Date: 2025    Operative Report    Pre-op Diagnosis:  Missed  [O02.1]  Mo/Di twin gestation   Post-op Diagnosis:  Same   Procedure: DILATION AND CURETTAGE WITH SUCTION   Surgeon:  Surgical assistant: COSTA Marin was responsible for performing the following activities: Retraction and their skilled assistance was necessary for the success of this case.     OR Staff: Circulator: Flora Mccormick RN; Amy Greer RN  Scrub Person: Tiarra Olsen     Anesthesia: Sedation   Estimated Blood Loss: 20 mls   ABx: Doxycycline 100 mg PO prior to the procedure, Doxycycline 200 mg PO following the procedure   Specimens:  Endometrial curettings with products of conception       Complications: none     Description of Procedure:    After anesthesia was determined to be adequate and an appropriate timeout was performed, the patient was prepped and draped in the usual sterile fashion. The patient had been placed in the dorsal lithotomy position using Jerrod stirrups.  The bladder was drained with a red rubber catheter per nursing.  A heavy-weighted speculum and Dao retractor were placed in the vagina. A paracervical block was performed using 1% lidocaine with epinephrine. The anterior lip of the cervix was grasped with a long Allis clamp. The uterus was sounded and noted to be anteverted. The cervix was then progressively dilated to allow entrance of a 10 mm suction curette. Suction curettage was then performed with several passes of this curette. A gentle, sharp curettage was then performed using a non-serrated curette. The suction curette was then reintroduced to ensure all products were removed from the uterine cavity. All surgical specimens were labeled and sent to pathology for review. The Allis clamp was then removed and the cervix was noted to be hemostatic. The patient tolerated the procedure well. There  were no complications. All instrument and sponge counts were correct at the end of the procedure.  She was taken to postoperative recovery room in stable condition.    Js Hannah MD  Obstetrics and Gynecology  [facility]

## 2025-06-26 NOTE — CONSULTS
GYNECOLOGY CONSULT NOTE    CHIEF COMPLAINT: Cramping and bleeding    DIAGNOSIS:  Missed   Mo/Di twin gestation    ASSESSMENT/PLAN     30 y.o.  female who presents to the ER with cramping and bleeding.  Subsequently found on ultrasound to have a missed .    # Missed AB  We reviewed her symptoms and ultrasound findings in the ER this morning.  The CRL's and lack of fetal cardiac activity is definitive for a missed AB x 2.  Additionally, she has undergone 2 previous outside ultrasounds that revealed cardiac activity for both babies.  We discussed expectant management, medical therapy with Cytotec and suction D&C.  After reviewing the risks and benefits of these approaches, the patient requested to proceed with a D&C procedure.  Plan for dilation and curettage with suction and all other indicated procedures.  Risks for the procedure were reviewed in detail.  All questions answered.  Blood type pending.  Doxycycline before and after the procedure.    HISTORY OF PRESENT ILLNESS     30 y.o.  female who presents to the ER with cramping and bleeding.  Subsequently found on ultrasound to have a missed .    Bleeding and cramping starting early this morning.  Outside ultrasounds have been reassuring.    REVIEW OF SYSTEMS: A complete review of systems was performed and was specifically negative for headache, changes in vision, RUQ pain, shortness of breath, chest pain, lower extremity edema and dysuria.     HISTORY:  Obstetrical History:  OB History   No obstetric history on file.       Past Medical History:  History reviewed. No pertinent past medical history.    Past Surgical History:  History reviewed. No pertinent surgical history.    Social History:       Family History  History reviewed. No pertinent family history.     Allergies: No Known Allergies    No current facility-administered medications on file prior to encounter.     No current outpatient medications on file prior to encounter.          OBJECTIVE   VITALS:  Temp:  [98.2 °F (36.8 °C)] 98.2 °F (36.8 °C)  Heart Rate:  [] 90  Resp:  [16] 16  BP: (119)/(78-84) 119/78    PHYSICAL EXAM:  General Appearance: alert, tearful, appears stated age, interactive and cooperative    DIAGNOSTIC STUDIES:  Results from last 7 days   Lab Units 06/26/25  0711   WBC 10*3/mm3 10.09   HEMOGLOBIN g/dL 13.3   HEMATOCRIT % 39.4   PLATELETS 10*3/mm3 304   SODIUM mmol/L 139   POTASSIUM mmol/L 3.9   CHLORIDE mmol/L 105   CO2 mmol/L 22.2   BUN mg/dL 5.0*   CREATININE mg/dL 0.44*   GLUCOSE mg/dL 84   CALCIUM mg/dL 9.0   AST (SGOT) U/L 19   ALT (SGPT) U/L 35*       Recent Results (from the past 24 hours)   Comprehensive Metabolic Panel    Collection Time: 06/26/25  7:11 AM    Specimen: Blood   Result Value Ref Range    Glucose 84 65 - 99 mg/dL    BUN 5.0 (L) 6.0 - 20.0 mg/dL    Creatinine 0.44 (L) 0.57 - 1.00 mg/dL    Sodium 139 136 - 145 mmol/L    Potassium 3.9 3.5 - 5.2 mmol/L    Chloride 105 98 - 107 mmol/L    CO2 22.2 22.0 - 29.0 mmol/L    Calcium 9.0 8.6 - 10.5 mg/dL    Total Protein 6.8 6.0 - 8.5 g/dL    Albumin 3.5 3.5 - 5.2 g/dL    ALT (SGPT) 35 (H) 1 - 33 U/L    AST (SGOT) 19 1 - 32 U/L    Alkaline Phosphatase 51 39 - 117 U/L    Total Bilirubin 0.2 0.0 - 1.2 mg/dL    Globulin 3.3 gm/dL    A/G Ratio 1.1 g/dL    BUN/Creatinine Ratio 11.4 7.0 - 25.0    Anion Gap 11.8 5.0 - 15.0 mmol/L    eGFR 133.6 >60.0 mL/min/1.73   Lipase    Collection Time: 06/26/25  7:11 AM    Specimen: Blood   Result Value Ref Range    Lipase 24 13 - 60 U/L   hCG, Quantitative, Pregnancy    Collection Time: 06/26/25  7:11 AM    Specimen: Blood   Result Value Ref Range    HCG Quantitative 125,303.00 mIU/mL   C-reactive Protein    Collection Time: 06/26/25  7:11 AM    Specimen: Blood   Result Value Ref Range    C-Reactive Protein 2.22 (H) 0.00 - 0.50 mg/dL   Lactic Acid, Plasma    Collection Time: 06/26/25  7:11 AM    Specimen: Blood   Result Value Ref Range    Lactate 1.8 0.5 - 2.0 mmol/L    CBC Auto Differential    Collection Time: 06/26/25  7:11 AM    Specimen: Blood   Result Value Ref Range    WBC 10.09 3.40 - 10.80 10*3/mm3    RBC 4.52 3.77 - 5.28 10*6/mm3    Hemoglobin 13.3 12.0 - 15.9 g/dL    Hematocrit 39.4 34.0 - 46.6 %    MCV 87.2 79.0 - 97.0 fL    MCH 29.4 26.6 - 33.0 pg    MCHC 33.8 31.5 - 35.7 g/dL    RDW 12.6 12.3 - 15.4 %    RDW-SD 40.3 37.0 - 54.0 fl    MPV 10.6 6.0 - 12.0 fL    Platelets 304 140 - 450 10*3/mm3    Neutrophil % 66.8 42.7 - 76.0 %    Lymphocyte % 25.6 19.6 - 45.3 %    Monocyte % 6.0 5.0 - 12.0 %    Eosinophil % 1.2 0.3 - 6.2 %    Basophil % 0.2 0.0 - 1.5 %    Immature Grans % 0.2 0.0 - 0.5 %    Neutrophils, Absolute 6.74 1.70 - 7.00 10*3/mm3    Lymphocytes, Absolute 2.58 0.70 - 3.10 10*3/mm3    Monocytes, Absolute 0.61 0.10 - 0.90 10*3/mm3    Eosinophils, Absolute 0.12 0.00 - 0.40 10*3/mm3    Basophils, Absolute 0.02 0.00 - 0.20 10*3/mm3    Immature Grans, Absolute 0.02 0.00 - 0.05 10*3/mm3    nRBC 0.0 0.0 - 0.2 /100 WBC   Urinalysis With Culture If Indicated - Urine, Clean Catch    Collection Time: 06/26/25  7:12 AM    Specimen: Urine, Clean Catch   Result Value Ref Range    Color, UA Red (A) Yellow, Straw    Appearance, UA Turbid (A) Clear    pH, UA 7.0 5.0 - 8.0    Specific Gravity, UA 1.017 1.005 - 1.030    Glucose, UA Negative Negative    Ketones, UA Negative Negative    Bilirubin, UA Negative Negative    Blood, UA Large (3+) (A) Negative    Protein,  mg/dL (2+) (A) Negative    Leuk Esterase, UA Small (1+) (A) Negative    Nitrite, UA Negative Negative    Urobilinogen, UA 0.2 E.U./dL 0.2 - 1.0 E.U./dL   Urinalysis, Microscopic Only - Urine, Clean Catch    Collection Time: 06/26/25  7:12 AM    Specimen: Urine, Clean Catch   Result Value Ref Range    RBC, UA Too Numerous to Count (A) None Seen, 0-2 /HPF    WBC, UA Unable to determine due to loaded field (A) None Seen, 0-2 /HPF    Bacteria, UA Unable to determine due to loaded field (A) None Seen /HPF    Squamous  Epithelial Cells, UA Unable to determine due to loaded field (A) None Seen, 0-2 /HPF    Hyaline Casts, UA Unable to determine due to loaded field None Seen /LPF    Methodology Manual Light Microscopy    COVID-19 and FLU A/B PCR, 1 HR TAT - Swab, Nasopharynx    Collection Time: 06/26/25  7:17 AM    Specimen: Nasopharynx; Swab   Result Value Ref Range    COVID19 Not Detected Not Detected - Ref. Range    Influenza A PCR Not Detected Not Detected    Influenza B PCR Not Detected Not Detected       Js Hannah MD  Obstetrics and Gynecology  [facility]

## 2025-06-27 LAB — BACTERIA SPEC AEROBE CULT: NO GROWTH

## 2025-06-28 NOTE — H&P
GYNECOLOGY HISTORY AND PHYSICAL    CHIEF COMPLAINT: Scheduled surgery    DIAGNOSIS:  Missed   Mo/Di twin gestation    ASSESSMENT/PLAN     30 y.o.  female who presents for scheduled D&C with suction.    - Proceed to the OR for scheduled surgery  - Risks for the procedure reviewed  - SCDs for DVT ppx  - Antibiotics: Doxycycline before and after the procedure    HISTORY OF PRESENT ILLNESS     30 y.o.  female who presents for the scheduled procedure listed above.    She has no complaints today and there are no interval changes to the history.    REVIEW OF SYSTEMS: A complete review of systems was performed and was specifically negative for headache, changes in vision, RUQ pain, shortness of breath, chest pain, lower extremity edema and dysuria.     HISTORY:  Obstetrical History:  OB History    Para Term  AB Living   1        SAB IAB Ectopic Molar Multiple Live Births              # Outcome Date GA Lbr Brandon/2nd Weight Sex Type Anes PTL Lv   1 Current                Past Medical History:  Past Medical History:   Diagnosis Date    GERD (gastroesophageal reflux disease)        Past Surgical History:  History reviewed. No pertinent surgical history.    Social History:  Social History     Tobacco Use    Smoking status: Never    Smokeless tobacco: Never   Vaping Use    Vaping status: Never Used   Substance Use Topics    Alcohol use: Never    Drug use: Never       Family History  History reviewed. No pertinent family history.     Allergies: No Known Allergies    OBJECTIVE   VITALS:       PHYSICAL EXAM:  GENERAL: NAD, alert  CHEST: No increased work of breathing, CTAB  CV: RRR, WWP  ABDOMEN: Soft, NTTP  EXTREMITIES:  Warm and well-perfused, nontender, nonedematous  NEURO: AAO x 3, CN II-XII grossly intact    No current facility-administered medications on file prior to encounter.     No current outpatient medications on file prior to encounter.       DIAGNOSTIC STUDIES:  Results from last 7 days    Lab Units 06/26/25  0711   WBC 10*3/mm3 10.09   HEMOGLOBIN g/dL 13.3   HEMATOCRIT % 39.4   PLATELETS 10*3/mm3 304   SODIUM mmol/L 139   POTASSIUM mmol/L 3.9   CHLORIDE mmol/L 105   CO2 mmol/L 22.2   BUN mg/dL 5.0*   CREATININE mg/dL 0.44*   GLUCOSE mg/dL 84   CALCIUM mg/dL 9.0   AST (SGOT) U/L 19   ALT (SGPT) U/L 35*       No results found for this or any previous visit (from the past 24 hours).    Js Hannah MD  Obstetrics and Gynecology  [facility]

## 2025-06-30 LAB — REF LAB TEST METHOD: NORMAL

## 2025-07-03 ENCOUNTER — OFFICE VISIT (OUTPATIENT)
Dept: OBSTETRICS AND GYNECOLOGY | Facility: CLINIC | Age: 30
End: 2025-07-03
Payer: MEDICAID

## 2025-07-03 VITALS
DIASTOLIC BLOOD PRESSURE: 66 MMHG | BODY MASS INDEX: 38.83 KG/M2 | WEIGHT: 197.8 LBS | SYSTOLIC BLOOD PRESSURE: 102 MMHG | HEIGHT: 60 IN

## 2025-07-03 DIAGNOSIS — K59.00 CONSTIPATION, UNSPECIFIED CONSTIPATION TYPE: ICD-10-CM

## 2025-07-03 DIAGNOSIS — O02.1 MISSED ABORTION: Primary | ICD-10-CM

## 2025-07-03 RX ORDER — DOCUSATE SODIUM 100 MG/1
100 CAPSULE, LIQUID FILLED ORAL 2 TIMES DAILY
Qty: 60 CAPSULE | Refills: 1 | Status: SHIPPED | OUTPATIENT
Start: 2025-07-03

## 2025-07-03 RX ORDER — ALUMINUM ZIRCONIUM OCTACHLOROHYDREX GLY 16 G/100G
GEL TOPICAL DAILY
Qty: 283 G | Refills: 3 | Status: SHIPPED | OUTPATIENT
Start: 2025-07-03

## 2025-07-15 NOTE — PROGRESS NOTES
"Postoperative Assessment Visit    Subjective   Chief Complaint   Patient presents with    Post-op Follow-up     C/o some cramping and the past days has had some bleeding. Also wanted to mention constipation        30 y.o.  female who presents for a post-op visit.    Pre-op Diagnosis:  Missed  [O02.1]  Mo/Di twin gestation   Post-op Diagnosis:  Same   Procedure: DILATION AND CURETTAGE WITH SUCTION     The patient is doing well with no issues.     Objective   Vitals:    25 1110   BP: 102/66   Weight: 89.7 kg (197 lb 12.8 oz)   Height: 152.4 cm (60\")     Physical Exam:  Reassuring postoperative exam       Medical Decision Making:    I have reviewed the operative note.  I have reviewed the postoperative labs where appropriate.    Assessment   Missed AB s/p D&C  Post-op evaluation  Constipation     Plan    No orders of the defined types were placed in this encounter.      Medication Management: Colace + Metamucil    Patient is meeting all post-op milestones.  Pathology reviewed - benign POCs  Surgical findings discussed.  Postoperative precautions and restrictions reviewed.  Constipation + medication.    RTC as needed.    Js Hannah MD  Obstetrics and Gynecology  [facility]   "

## (undated) DEVICE — SLV SCD CALF HEMOFORCE DVT THERP REPROC MD

## (undated) DEVICE — CURETTE UTER VAC OPNTP CRV 10MM STRL

## (undated) DEVICE — HNDL SWVL PRECONN W/TBG 6FT 1/2IN

## (undated) DEVICE — GLV SURG SENSICARE PI LF PF 7.5 GRN STRL

## (undated) DEVICE — RICH MINOR LITHOTOMY: Brand: MEDLINE INDUSTRIES, INC.

## (undated) DEVICE — GLV SURG BIOGEL PI ULTRATOUCH G SZ7.5 LF

## (undated) DEVICE — SOL IRR H2O BO 1000ML STRL

## (undated) DEVICE — SPECIMEN SOCK - LARGE PORT: Brand: MEDI-VAC